# Patient Record
Sex: FEMALE | Race: WHITE | Employment: OTHER | URBAN - METROPOLITAN AREA
[De-identification: names, ages, dates, MRNs, and addresses within clinical notes are randomized per-mention and may not be internally consistent; named-entity substitution may affect disease eponyms.]

---

## 2021-06-29 ENCOUNTER — HOSPITAL ENCOUNTER (EMERGENCY)
Age: 41
Discharge: HOME | End: 2021-06-29
Attending: EMERGENCY MEDICINE
Payer: MEDICAID

## 2021-06-29 ENCOUNTER — APPOINTMENT (OUTPATIENT)
Dept: CT IMAGING | Age: 41
End: 2021-06-29
Attending: EMERGENCY MEDICINE
Payer: MEDICAID

## 2021-06-29 ENCOUNTER — APPOINTMENT (OUTPATIENT)
Dept: GENERAL RADIOLOGY | Age: 41
End: 2021-06-29
Attending: EMERGENCY MEDICINE
Payer: MEDICAID

## 2021-06-29 VITALS
SYSTOLIC BLOOD PRESSURE: 122 MMHG | HEART RATE: 84 BPM | BODY MASS INDEX: 22.58 KG/M2 | HEIGHT: 60 IN | RESPIRATION RATE: 17 BRPM | DIASTOLIC BLOOD PRESSURE: 81 MMHG | WEIGHT: 115 LBS | OXYGEN SATURATION: 99 % | TEMPERATURE: 98.7 F

## 2021-06-29 DIAGNOSIS — M54.2 NECK PAIN: Primary | ICD-10-CM

## 2021-06-29 PROCEDURE — 73630 X-RAY EXAM OF FOOT: CPT

## 2021-06-29 PROCEDURE — 73030 X-RAY EXAM OF SHOULDER: CPT

## 2021-06-29 PROCEDURE — 74011250636 HC RX REV CODE- 250/636: Performed by: EMERGENCY MEDICINE

## 2021-06-29 PROCEDURE — 96372 THER/PROPH/DIAG INJ SC/IM: CPT

## 2021-06-29 PROCEDURE — 99284 EMERGENCY DEPT VISIT MOD MDM: CPT

## 2021-06-29 PROCEDURE — 72125 CT NECK SPINE W/O DYE: CPT

## 2021-06-29 PROCEDURE — 73080 X-RAY EXAM OF ELBOW: CPT

## 2021-06-29 RX ORDER — KETOROLAC TROMETHAMINE 30 MG/ML
30 INJECTION, SOLUTION INTRAMUSCULAR; INTRAVENOUS
Status: COMPLETED | OUTPATIENT
Start: 2021-06-29 | End: 2021-06-29

## 2021-06-29 RX ORDER — DIAZEPAM 10 MG/1
10 TABLET ORAL
Qty: 9 TABLET | Refills: 0 | Status: SHIPPED | OUTPATIENT
Start: 2021-06-29 | End: 2021-07-02

## 2021-06-29 RX ORDER — FLUOXETINE HYDROCHLORIDE 40 MG/1
40 CAPSULE ORAL DAILY
COMMUNITY

## 2021-06-29 RX ORDER — MONTELUKAST SODIUM 10 MG/1
10 TABLET ORAL DAILY
COMMUNITY

## 2021-06-29 RX ORDER — DIAZEPAM 10 MG/2ML
10 INJECTION INTRAMUSCULAR ONCE
Status: COMPLETED | OUTPATIENT
Start: 2021-06-29 | End: 2021-06-29

## 2021-06-29 RX ORDER — IBUPROFEN 800 MG/1
800 TABLET ORAL
Qty: 20 TABLET | Refills: 0 | Status: SHIPPED | OUTPATIENT
Start: 2021-06-29 | End: 2021-07-06

## 2021-06-29 RX ORDER — SIMVASTATIN 20 MG/1
20 TABLET, FILM COATED ORAL
COMMUNITY

## 2021-06-29 RX ADMIN — KETOROLAC TROMETHAMINE 30 MG: 30 INJECTION, SOLUTION INTRAMUSCULAR; INTRAVENOUS at 15:02

## 2021-06-29 RX ADMIN — DIAZEPAM 10 MG: 5 INJECTION, SOLUTION INTRAMUSCULAR; INTRAVENOUS at 15:04

## 2021-06-29 NOTE — ED NOTES
The patient was discharged home by Dr Rivas Officer in stable condition. The patient is alert and oriented, in no respiratory distress. The patient's diagnosis, condition and treatment were explained. The patient expressed understanding. 2 prescriptions given. A discharge plan has been developed. A  was not involved in the process. Aftercare instructions were given. Pt ambulatory out of the ED.
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
## 2021-07-12 ENCOUNTER — TRANSCRIBE ORDER (OUTPATIENT)
Dept: GENERAL RADIOLOGY | Age: 41
End: 2021-07-12

## 2021-07-12 ENCOUNTER — HOSPITAL ENCOUNTER (OUTPATIENT)
Dept: GENERAL RADIOLOGY | Age: 41
Discharge: HOME | End: 2021-07-12
Attending: INTERNAL MEDICINE
Payer: MEDICAID

## 2021-07-12 DIAGNOSIS — M25.571 ACUTE RIGHT ANKLE PAIN: Primary | ICD-10-CM

## 2021-07-12 DIAGNOSIS — M25.571 ACUTE RIGHT ANKLE PAIN: ICD-10-CM

## 2021-07-12 PROCEDURE — 73610 X-RAY EXAM OF ANKLE: CPT

## 2022-12-03 NOTE — PROGRESS NOTES
Sherri Ross is a 36 y.o. female returns for an annual exam     Chief Complaint   Patient presents with    Well Woman       No LMP recorded. (Menstrual status: Other (see Comment)). Her periods are absent s/p ablation. Problems:  breast cyst, hot flashes, and night sweats  Birth Control: tubal ligation. Last Pap: pap performed today  She does not have a history of HOLLY 2, 3 or cervical cancer. Last Mammogram: has not had a recent mammogram.    Last Bone Density: n/a  Last colonoscopy: normal obtained 4 year(s) ago. 1. Have you been to the ER, urgent care clinic, or hospitalized since your last visit? N/a-new pt    2. Have you seen or consulted any other health care providers outside of the [address] [place] [address] since your last visit? N/a-new pt    Examination chaperoned by Jesus Cotton CMA.

## 2022-12-05 ENCOUNTER — OFFICE VISIT (OUTPATIENT)
Dept: OBGYN CLINIC | Age: 42
End: 2022-12-05
Payer: MEDICAID

## 2022-12-05 VITALS — BODY MASS INDEX: 24.41 KG/M2 | WEIGHT: 125 LBS | SYSTOLIC BLOOD PRESSURE: 113 MMHG | DIASTOLIC BLOOD PRESSURE: 74 MMHG

## 2022-12-05 DIAGNOSIS — Z01.419 ENCOUNTER FOR GYNECOLOGICAL EXAMINATION WITHOUT ABNORMAL FINDING: ICD-10-CM

## 2022-12-05 DIAGNOSIS — Z12.31 SCREENING MAMMOGRAM FOR BREAST CANCER: ICD-10-CM

## 2022-12-05 DIAGNOSIS — Z01.419 WELL FEMALE EXAM WITH ROUTINE GYNECOLOGICAL EXAM: Primary | ICD-10-CM

## 2022-12-05 DIAGNOSIS — R23.2 HOT FLASHES: ICD-10-CM

## 2022-12-05 PROCEDURE — 99396 PREV VISIT EST AGE 40-64: CPT | Performed by: OBSTETRICS & GYNECOLOGY

## 2022-12-05 RX ORDER — LORATADINE 10 MG/1
10 TABLET ORAL
COMMUNITY

## 2022-12-06 LAB
ESTRADIOL SERPL-MCNC: 71.2 PG/ML
FSH SERPL-ACNC: 4.9 MIU/ML
LH SERPL-ACNC: 4.9 MIU/ML
SPECIMEN STATUS REPORT, ROLRST: NORMAL
TSH SERPL DL<=0.005 MIU/L-ACNC: 1.01 UIU/ML (ref 0.45–4.5)

## 2022-12-08 LAB
CYTOLOGIST CVX/VAG CYTO: NORMAL
CYTOLOGY CVX/VAG DOC CYTO: NORMAL
CYTOLOGY CVX/VAG DOC THIN PREP: NORMAL
CYTOLOGY HISTORY:: NORMAL
DX ICD CODE: NORMAL
HPV I/H RISK 4 DNA CVX QL PROBE+SIG AMP: NEGATIVE
Lab: NORMAL
OTHER STN SPEC: NORMAL
STAT OF ADQ CVX/VAG CYTO-IMP: NORMAL